# Patient Record
Sex: MALE | Race: WHITE | NOT HISPANIC OR LATINO
[De-identification: names, ages, dates, MRNs, and addresses within clinical notes are randomized per-mention and may not be internally consistent; named-entity substitution may affect disease eponyms.]

---

## 2022-04-19 PROBLEM — Z00.00 ENCOUNTER FOR PREVENTIVE HEALTH EXAMINATION: Status: ACTIVE | Noted: 2022-04-19

## 2022-04-22 DIAGNOSIS — Z86.79 PERSONAL HISTORY OF OTHER DISEASES OF THE CIRCULATORY SYSTEM: ICD-10-CM

## 2022-04-22 DIAGNOSIS — Z78.9 OTHER SPECIFIED HEALTH STATUS: ICD-10-CM

## 2022-04-26 ENCOUNTER — NON-APPOINTMENT (OUTPATIENT)
Age: 22
End: 2022-04-26

## 2022-04-26 ENCOUNTER — APPOINTMENT (OUTPATIENT)
Dept: SURGERY | Facility: CLINIC | Age: 22
End: 2022-04-26
Payer: COMMERCIAL

## 2022-04-26 VITALS
WEIGHT: 210 LBS | TEMPERATURE: 97.6 F | HEART RATE: 64 BPM | HEIGHT: 74 IN | DIASTOLIC BLOOD PRESSURE: 79 MMHG | SYSTOLIC BLOOD PRESSURE: 142 MMHG | BODY MASS INDEX: 26.95 KG/M2

## 2022-04-26 DIAGNOSIS — K40.90 UNILATERAL INGUINAL HERNIA, W/OUT OBSTRUCTION OR GANGRENE, NOT SPECIFIED AS RECURRENT: ICD-10-CM

## 2022-04-26 PROCEDURE — 99204 OFFICE O/P NEW MOD 45 MIN: CPT

## 2022-05-04 ENCOUNTER — APPOINTMENT (OUTPATIENT)
Dept: SURGERY | Facility: HOSPITAL | Age: 22
End: 2022-05-04

## 2022-05-17 ENCOUNTER — APPOINTMENT (OUTPATIENT)
Dept: SURGERY | Facility: CLINIC | Age: 22
End: 2022-05-17
Payer: COMMERCIAL

## 2022-05-17 PROCEDURE — 99024 POSTOP FOLLOW-UP VISIT: CPT

## 2022-05-17 NOTE — HISTORY OF PRESENT ILLNESS
[de-identified] : 22 yr old s/p Left inguinal hernia repair robotically on 5/4/22.  States he was feeling almost 100 percent normal 4 days after surgery.  NO issues other than some bruising at the umbilicus.

## 2022-05-17 NOTE — PLAN
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  No restrictions except no exercise bike for 5 more days. \par Follow up as needed.

## 2022-05-17 NOTE — PHYSICAL EXAM
[de-identified] : NAD [de-identified] : moderate size ecchymosis inferior to umbilical incision site.  all sites well healed.

## 2022-05-17 NOTE — CONSULT LETTER
[Dear  ___] : Dear  [unfilled], [Courtesy Letter:] : I had the pleasure of seeing your patient, [unfilled], in my office today. [Please see my note below.] : Please see my note below. [Sincerely,] : Sincerely, [FreeTextEntry3] : Nery Duarte MD FACS\par Acute Care and General Surgery\par [facility]\par \par Office phone: [phone]\par Cell phone:  [phone]\par email:  [email or web address]\par

## 2022-05-20 PROBLEM — K40.90 LEFT INGUINAL HERNIA: Status: ACTIVE | Noted: 2022-04-26

## 2022-05-20 NOTE — HISTORY OF PRESENT ILLNESS
[de-identified] : Mr. SHAHLA SAMAYOA presents today with discomfort in the left groin.  He has had the hernia for about 2 weeks, as he first noticed it upon arrival to [place] for the team spring break.   He is aware of a bulge in the groin and some discomfort that radiates to the groin.  There are no similar symptoms on the opposite side.  He  denies any obstructive symptoms such as nausea/vomiting or change in bowel habits.  Mr. SAMAYOA states that increased activity and coughing or sneezing seem to aggravate the symptoms.  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  He typically works out very hard, but has taken it easy the past 2 weeks. Rest seems to relieve them.  His PCP was able to diagnose a hernia on physical exam and he is now here to discuss further work up and management.\par

## 2022-05-20 NOTE — CONSULT LETTER
[Dear  ___] : Dear  [unfilled], [Consult Letter:] : I had the pleasure of evaluating your patient, [unfilled]. [Please see my note below.] : Please see my note below. [Consult Closing:] : Thank you very much for allowing me to participate in the care of this patient.  If you have any questions, please do not hesitate to contact me. [Sincerely,] : Sincerely, [FreeTextEntry3] : Nery Duarte MD FACS\par Acute Care and General Surgery\par [facility]\par \par Office phone: [phone]\par Cell phone:  [phone]\par email:  [email or web address]\par

## 2022-05-20 NOTE — PHYSICAL EXAM
[Respiratory Effort] : normal respiratory effort [No Rash or Lesion] : No rash or lesion [Calm] : calm [de-identified] : NAD [de-identified] : normal cephalic; sclera non-icteric [de-identified] : soft, nontender.  + Left inguinal hernia, non-reducible fat.  No hernia palpated on the right.  [de-identified] : intact grossly

## 2022-05-20 NOTE — REASON FOR VISIT
[Initial Eval - Existing Diagnosis] : an initial evaluation of an existing diagnosis [FreeTextEntry1] : left inguinal hernia

## 2022-05-20 NOTE — PLAN
[FreeTextEntry1] : SHAHLA SAMAYOA has a left inguinal hernia determined by physical exam.  We discussed the options for management of symptomatic hernias, as well as surgical repair.  I have recommended a laparoscopic robotic approach to this particular hernia given the quicker recovery time and superior visualization of the anatomy.  We discussed the risks of the surgery which include, but are not limited to injury to the gonadal structures, intestinal injury, vascular injury, hernia recurrence, mesh infection, seroma, or hematoma.  We discussed the likelihood of bruising and swelling in the immediate post op period in the groin and that this should be managed with ice packs for the first 24 hrs. \par Post operative activity restrictions include no heavy lifting or core activities/straining for 14 days. Routine daily activities can be resumed in 1-2 days and light activity (such as walking for exercise or jogging) can be resumed in 7 days.  Driving can be resumed when pain level is felt to be manageable.   No swimming in pool or hot tub for 1 week post operatively.  Return to work is generally in 7-10 days, but is individualized according to the patient.  Mr. SAMAYOA understands the risks benefits and alternatives of the proposed inguinal hernia repair and would like to proceed.\par \par We will determine a date for surgery at the his convenience and obtain medical clearance as appropriate. \par \par \par